# Patient Record
Sex: FEMALE | Race: ASIAN | NOT HISPANIC OR LATINO
[De-identification: names, ages, dates, MRNs, and addresses within clinical notes are randomized per-mention and may not be internally consistent; named-entity substitution may affect disease eponyms.]

---

## 2022-12-07 PROBLEM — Z00.00 ENCOUNTER FOR PREVENTIVE HEALTH EXAMINATION: Status: ACTIVE | Noted: 2022-12-07

## 2022-12-09 ENCOUNTER — NON-APPOINTMENT (OUTPATIENT)
Age: 37
End: 2022-12-09

## 2022-12-09 ENCOUNTER — APPOINTMENT (OUTPATIENT)
Dept: OBGYN | Facility: CLINIC | Age: 37
End: 2022-12-09

## 2022-12-09 ENCOUNTER — TRANSCRIPTION ENCOUNTER (OUTPATIENT)
Age: 37
End: 2022-12-09

## 2022-12-09 VITALS
DIASTOLIC BLOOD PRESSURE: 79 MMHG | SYSTOLIC BLOOD PRESSURE: 132 MMHG | HEIGHT: 65 IN | WEIGHT: 113 LBS | BODY MASS INDEX: 18.83 KG/M2

## 2022-12-09 PROCEDURE — 99385 PREV VISIT NEW AGE 18-39: CPT

## 2022-12-09 NOTE — PHYSICAL EXAM
[Chaperone Declined] : Patient declined chaperone [Appropriately responsive] : appropriately responsive [Alert] : alert [No Acute Distress] : no acute distress [Soft] : soft [Non-tender] : non-tender [Non-distended] : non-distended [No HSM] : No HSM [No Lesions] : no lesions [No Mass] : no mass [Oriented x3] : oriented x3 [Examination Of The Breasts] : a normal appearance [No Masses] : no breast masses were palpable [Labia Majora] : normal [Labia Minora] : normal [Normal] : normal [Uterine Adnexae] : normal

## 2022-12-12 LAB — HPV HIGH+LOW RISK DNA PNL CVX: NOT DETECTED

## 2022-12-20 LAB — CYTOLOGY CVX/VAG DOC THIN PREP: NORMAL

## 2023-12-11 ENCOUNTER — APPOINTMENT (OUTPATIENT)
Dept: OBGYN | Facility: CLINIC | Age: 38
End: 2023-12-11
Payer: COMMERCIAL

## 2023-12-11 VITALS
DIASTOLIC BLOOD PRESSURE: 75 MMHG | HEIGHT: 65 IN | BODY MASS INDEX: 19.49 KG/M2 | SYSTOLIC BLOOD PRESSURE: 116 MMHG | WEIGHT: 117 LBS

## 2023-12-11 DIAGNOSIS — Z01.419 ENCOUNTER FOR GYNECOLOGICAL EXAMINATION (GENERAL) (ROUTINE) W/OUT ABNORMAL FINDINGS: ICD-10-CM

## 2023-12-11 PROCEDURE — 99395 PREV VISIT EST AGE 18-39: CPT

## 2024-02-08 ENCOUNTER — TRANSCRIPTION ENCOUNTER (OUTPATIENT)
Age: 39
End: 2024-02-08

## 2024-02-15 ENCOUNTER — APPOINTMENT (OUTPATIENT)
Dept: OBGYN | Facility: CLINIC | Age: 39
End: 2024-02-15
Payer: COMMERCIAL

## 2024-02-15 VITALS — DIASTOLIC BLOOD PRESSURE: 73 MMHG | BODY MASS INDEX: 19.8 KG/M2 | SYSTOLIC BLOOD PRESSURE: 120 MMHG | WEIGHT: 119 LBS

## 2024-02-15 DIAGNOSIS — N92.6 IRREGULAR MENSTRUATION, UNSPECIFIED: ICD-10-CM

## 2024-02-15 PROCEDURE — 99213 OFFICE O/P EST LOW 20 MIN: CPT | Mod: 25

## 2024-02-15 PROCEDURE — 76817 TRANSVAGINAL US OBSTETRIC: CPT

## 2024-02-15 NOTE — HISTORY OF PRESENT ILLNESS
[FreeTextEntry1] : LMP   37yo  here for amenorrhea and +upt, would be at 7+5. Notes nausea and vomiting. Here with  today.  #Routine - POB: FT  x 3 (2017- girl Lorin 6#, 2018- boy Cayla 7+#, - Zaynab, initially was told had previa but resolved, had PPH requiring immediate OR for ?cervical repair, no blood transfusion) - pap 2023 nilm/hpv neg

## 2024-02-15 NOTE — PROCEDURE
[Transvaginal OB Sonogram] : Transvaginal OB Sonogram [Intrauterine Pregnancy] : intrauterine pregnancy [Yolk Sac] : yolk sac present [Fetal Heart] : fetal heart present [Date: ___] : Date: [unfilled] [Current GA by Sonogram: ___ (wks)] : Current GA by Sonogram: [unfilled]Uwks [___ day(s)] : [unfilled] days [FreeTextEntry1] : T 164

## 2024-02-15 NOTE — PLAN
[FreeTextEntry1] : S=d disucsed practice. RTO 4 wk for new OB. Discussed unisom/b6, to CBO if not improved.  Pt traveling to [place] in 8/2024, discussed travel precautions. NT given.

## 2024-03-14 ENCOUNTER — APPOINTMENT (OUTPATIENT)
Dept: ANTEPARTUM | Facility: CLINIC | Age: 39
End: 2024-03-14
Payer: COMMERCIAL

## 2024-03-14 ENCOUNTER — ASOB RESULT (OUTPATIENT)
Age: 39
End: 2024-03-14

## 2024-03-14 ENCOUNTER — APPOINTMENT (OUTPATIENT)
Dept: OBGYN | Facility: CLINIC | Age: 39
End: 2024-03-14
Payer: COMMERCIAL

## 2024-03-14 VITALS
SYSTOLIC BLOOD PRESSURE: 120 MMHG | DIASTOLIC BLOOD PRESSURE: 77 MMHG | WEIGHT: 122.13 LBS | BODY MASS INDEX: 20.32 KG/M2

## 2024-03-14 DIAGNOSIS — R11.2 NAUSEA WITH VOMITING, UNSPECIFIED: ICD-10-CM

## 2024-03-14 DIAGNOSIS — Z34.91 ENCOUNTER FOR SUPERVISION OF NORMAL PREGNANCY, UNSPECIFIED, FIRST TRIMESTER: ICD-10-CM

## 2024-03-14 PROCEDURE — 76801 OB US < 14 WKS SINGLE FETUS: CPT | Mod: 59

## 2024-03-14 PROCEDURE — 0501F PRENATAL FLOW SHEET: CPT

## 2024-03-14 PROCEDURE — 76813 OB US NUCHAL MEAS 1 GEST: CPT

## 2024-03-14 RX ORDER — ONDANSETRON 4 MG/1
4 TABLET ORAL
Qty: 90 | Refills: 0 | Status: ACTIVE | COMMUNITY
Start: 2024-03-14 | End: 1900-01-01

## 2024-03-14 RX ORDER — PROMETHAZINE HYDROCHLORIDE 25 MG/1
25 TABLET ORAL 3 TIMES DAILY
Qty: 90 | Refills: 0 | Status: ACTIVE | COMMUNITY
Start: 2024-03-14 | End: 1900-01-01

## 2024-03-15 LAB
ABO + RH PNL BLD: NORMAL
BASOPHILS # BLD AUTO: 0.02 K/UL
BASOPHILS NFR BLD AUTO: 0.2 %
BLD GP AB SCN SERPL QL: NORMAL
EOSINOPHIL # BLD AUTO: 0.1 K/UL
EOSINOPHIL NFR BLD AUTO: 1.1 %
HBV SURFACE AG SER QL: NONREACTIVE
HCT VFR BLD CALC: 39.1 %
HCV AB SER QL: NONREACTIVE
HCV S/CO RATIO: 0.07 S/CO
HGB A MFR BLD: 97.3 %
HGB A2 MFR BLD: 2.7 %
HGB BLD-MCNC: 12.5 G/DL
HGB FRACT BLD-IMP: NORMAL
HIV1+2 AB SPEC QL IA.RAPID: NONREACTIVE
IMM GRANULOCYTES NFR BLD AUTO: 0.2 %
LYMPHOCYTES # BLD AUTO: 1.18 K/UL
LYMPHOCYTES NFR BLD AUTO: 13.3 %
MAN DIFF?: NORMAL
MCHC RBC-ENTMCNC: 31.6 PG
MCHC RBC-ENTMCNC: 32 GM/DL
MCV RBC AUTO: 99 FL
MEV IGG FLD QL IA: 159 AU/ML
MEV IGG+IGM SER-IMP: POSITIVE
MONOCYTES # BLD AUTO: 0.62 K/UL
MONOCYTES NFR BLD AUTO: 7 %
NEUTROPHILS # BLD AUTO: 6.93 K/UL
NEUTROPHILS NFR BLD AUTO: 78.2 %
PLATELET # BLD AUTO: 192 K/UL
RBC # BLD: 3.95 M/UL
RBC # FLD: 13.1 %
RUBV IGG FLD-ACNC: 1.7 INDEX
RUBV IGG SER-IMP: POSITIVE
T PALLIDUM AB SER QL IA: NEGATIVE
TSH SERPL-ACNC: 1.39 UIU/ML
VZV AB TITR SER: POSITIVE
VZV IGG SER IF-ACNC: 413 INDEX
WBC # FLD AUTO: 8.87 K/UL

## 2024-03-19 LAB — AR GENE MUT ANL BLD/T: NORMAL

## 2024-03-20 LAB — LEAD BLD-MCNC: <1 UG/DL

## 2024-03-21 LAB
CFTR MUT TESTED BLD/T: NEGATIVE
FMR1 GENE MUT ANL BLD/T: NORMAL

## 2024-04-06 ENCOUNTER — NON-APPOINTMENT (OUTPATIENT)
Age: 39
End: 2024-04-06

## 2024-04-11 ENCOUNTER — APPOINTMENT (OUTPATIENT)
Dept: OBGYN | Facility: CLINIC | Age: 39
End: 2024-04-11
Payer: COMMERCIAL

## 2024-04-11 VITALS
BODY MASS INDEX: 21.82 KG/M2 | SYSTOLIC BLOOD PRESSURE: 121 MMHG | DIASTOLIC BLOOD PRESSURE: 78 MMHG | WEIGHT: 131.13 LBS

## 2024-04-11 PROCEDURE — 0502F SUBSEQUENT PRENATAL CARE: CPT

## 2024-04-16 LAB
AFP MOM: 1.19
AFP VALUE: 41.9 NG/ML
ALPHA FETOPROTEIN SERUM COMMENT: NORMAL
ALPHA FETOPROTEIN SERUM INTERPRETATION: NORMAL
ALPHA FETOPROTEIN SERUM RESULTS: NORMAL
ALPHA FETOPROTEIN SERUM TEST RESULTS: NORMAL
GESTATIONAL AGE BASED ON: NORMAL
GESTATIONAL AGE ON COLLECTION DATE: 15.7 WEEKS
INSULIN DEP DIABETES: NO
MATERNAL AGE AT EDD AFP: 38.9 YR
MULTIPLE GESTATION: NO
OSBR RISK 1 IN: 6704
RACE: NORMAL
WEIGHT AFP: 131 LBS

## 2024-04-19 ENCOUNTER — NON-APPOINTMENT (OUTPATIENT)
Age: 39
End: 2024-04-19

## 2024-05-06 ENCOUNTER — NON-APPOINTMENT (OUTPATIENT)
Age: 39
End: 2024-05-06

## 2024-05-08 ENCOUNTER — APPOINTMENT (OUTPATIENT)
Dept: ANTEPARTUM | Facility: CLINIC | Age: 39
End: 2024-05-08
Payer: COMMERCIAL

## 2024-05-08 ENCOUNTER — APPOINTMENT (OUTPATIENT)
Dept: OBGYN | Facility: CLINIC | Age: 39
End: 2024-05-08
Payer: COMMERCIAL

## 2024-05-08 ENCOUNTER — ASOB RESULT (OUTPATIENT)
Age: 39
End: 2024-05-08

## 2024-05-08 VITALS
DIASTOLIC BLOOD PRESSURE: 66 MMHG | BODY MASS INDEX: 22.15 KG/M2 | SYSTOLIC BLOOD PRESSURE: 113 MMHG | WEIGHT: 133.13 LBS

## 2024-05-08 PROCEDURE — 76811 OB US DETAILED SNGL FETUS: CPT

## 2024-05-08 PROCEDURE — 0502F SUBSEQUENT PRENATAL CARE: CPT

## 2024-05-31 ENCOUNTER — NON-APPOINTMENT (OUTPATIENT)
Age: 39
End: 2024-05-31

## 2024-06-05 ENCOUNTER — APPOINTMENT (OUTPATIENT)
Dept: OBGYN | Facility: CLINIC | Age: 39
End: 2024-06-05
Payer: COMMERCIAL

## 2024-06-05 VITALS
DIASTOLIC BLOOD PRESSURE: 67 MMHG | WEIGHT: 135 LBS | HEIGHT: 65 IN | BODY MASS INDEX: 22.49 KG/M2 | SYSTOLIC BLOOD PRESSURE: 104 MMHG

## 2024-06-05 DIAGNOSIS — Z34.92 ENCOUNTER FOR SUPERVISION OF NORMAL PREGNANCY, UNSPECIFIED, SECOND TRIMESTER: ICD-10-CM

## 2024-06-05 PROCEDURE — 0502F SUBSEQUENT PRENATAL CARE: CPT

## 2024-06-06 LAB
BASOPHILS # BLD AUTO: 0.04 K/UL
BASOPHILS NFR BLD AUTO: 0.4 %
EOSINOPHIL # BLD AUTO: 0.12 K/UL
EOSINOPHIL NFR BLD AUTO: 1.3 %
GLUCOSE 1H P 50 G GLC PO SERPL-MCNC: 83 MG/DL
HCT VFR BLD CALC: 32.1 %
HGB BLD-MCNC: 10.3 G/DL
HIV1+2 AB SPEC QL IA.RAPID: NONREACTIVE
IMM GRANULOCYTES NFR BLD AUTO: 0.3 %
LYMPHOCYTES # BLD AUTO: 0.93 K/UL
LYMPHOCYTES NFR BLD AUTO: 10.2 %
MAN DIFF?: NORMAL
MCHC RBC-ENTMCNC: 32.1 GM/DL
MCHC RBC-ENTMCNC: 32.7 PG
MCV RBC AUTO: 101.9 FL
MONOCYTES # BLD AUTO: 0.6 K/UL
MONOCYTES NFR BLD AUTO: 6.6 %
NEUTROPHILS # BLD AUTO: 7.41 K/UL
NEUTROPHILS NFR BLD AUTO: 81.2 %
PLATELET # BLD AUTO: 200 K/UL
RBC # BLD: 3.15 M/UL
RBC # FLD: 14.6 %
T PALLIDUM AB SER QL IA: NEGATIVE
WBC # FLD AUTO: 9.13 K/UL

## 2024-07-03 ENCOUNTER — NON-APPOINTMENT (OUTPATIENT)
Age: 39
End: 2024-07-03

## 2024-07-09 ENCOUNTER — APPOINTMENT (OUTPATIENT)
Dept: OBGYN | Facility: CLINIC | Age: 39
End: 2024-07-09
Payer: COMMERCIAL

## 2024-07-09 VITALS
HEIGHT: 65 IN | WEIGHT: 136.31 LBS | SYSTOLIC BLOOD PRESSURE: 106 MMHG | BODY MASS INDEX: 22.71 KG/M2 | DIASTOLIC BLOOD PRESSURE: 68 MMHG

## 2024-07-09 PROCEDURE — 0502F SUBSEQUENT PRENATAL CARE: CPT

## 2024-07-09 PROCEDURE — 90715 TDAP VACCINE 7 YRS/> IM: CPT

## 2024-07-09 PROCEDURE — 90471 IMMUNIZATION ADMIN: CPT

## 2024-07-17 ENCOUNTER — APPOINTMENT (OUTPATIENT)
Dept: ANTEPARTUM | Facility: CLINIC | Age: 39
End: 2024-07-17
Payer: COMMERCIAL

## 2024-07-17 ENCOUNTER — ASOB RESULT (OUTPATIENT)
Age: 39
End: 2024-07-17

## 2024-07-17 PROCEDURE — 76816 OB US FOLLOW-UP PER FETUS: CPT

## 2024-07-17 PROCEDURE — 76819 FETAL BIOPHYS PROFIL W/O NST: CPT | Mod: 59

## 2024-07-23 ENCOUNTER — NON-APPOINTMENT (OUTPATIENT)
Age: 39
End: 2024-07-23

## 2024-07-23 ENCOUNTER — APPOINTMENT (OUTPATIENT)
Dept: OBGYN | Facility: CLINIC | Age: 39
End: 2024-07-23
Payer: COMMERCIAL

## 2024-07-23 VITALS
SYSTOLIC BLOOD PRESSURE: 103 MMHG | WEIGHT: 140.25 LBS | BODY MASS INDEX: 23.37 KG/M2 | DIASTOLIC BLOOD PRESSURE: 69 MMHG | HEIGHT: 65 IN

## 2024-07-23 DIAGNOSIS — Z34.91 ENCOUNTER FOR SUPERVISION OF NORMAL PREGNANCY, UNSPECIFIED, FIRST TRIMESTER: ICD-10-CM

## 2024-07-23 DIAGNOSIS — Z34.93 ENCOUNTER FOR SUPERVISION OF NORMAL PREGNANCY, UNSPECIFIED, THIRD TRIMESTER: ICD-10-CM

## 2024-07-23 DIAGNOSIS — Z34.92 ENCOUNTER FOR SUPERVISION OF NORMAL PREGNANCY, UNSPECIFIED, SECOND TRIMESTER: ICD-10-CM

## 2024-07-23 PROCEDURE — 0502F SUBSEQUENT PRENATAL CARE: CPT

## 2024-08-12 ENCOUNTER — NON-APPOINTMENT (OUTPATIENT)
Age: 39
End: 2024-08-12

## 2024-08-14 ENCOUNTER — APPOINTMENT (OUTPATIENT)
Dept: OBGYN | Facility: CLINIC | Age: 39
End: 2024-08-14
Payer: COMMERCIAL

## 2024-08-14 VITALS — BODY MASS INDEX: 23.46 KG/M2 | WEIGHT: 141 LBS | SYSTOLIC BLOOD PRESSURE: 100 MMHG | DIASTOLIC BLOOD PRESSURE: 65 MMHG

## 2024-08-14 DIAGNOSIS — Z34.93 ENCOUNTER FOR SUPERVISION OF NORMAL PREGNANCY, UNSPECIFIED, THIRD TRIMESTER: ICD-10-CM

## 2024-08-14 PROCEDURE — 0502F SUBSEQUENT PRENATAL CARE: CPT

## 2024-09-04 ENCOUNTER — APPOINTMENT (OUTPATIENT)
Dept: OBGYN | Facility: CLINIC | Age: 39
End: 2024-09-04
Payer: COMMERCIAL

## 2024-09-04 VITALS — BODY MASS INDEX: 23.96 KG/M2 | DIASTOLIC BLOOD PRESSURE: 60 MMHG | SYSTOLIC BLOOD PRESSURE: 112 MMHG | WEIGHT: 144 LBS

## 2024-09-04 DIAGNOSIS — Z34.93 ENCOUNTER FOR SUPERVISION OF NORMAL PREGNANCY, UNSPECIFIED, THIRD TRIMESTER: ICD-10-CM

## 2024-09-04 PROCEDURE — 0502F SUBSEQUENT PRENATAL CARE: CPT

## 2024-09-04 PROCEDURE — 90686 IIV4 VACC NO PRSV 0.5 ML IM: CPT

## 2024-09-04 PROCEDURE — G0008: CPT

## 2024-09-06 LAB
GP B STREP DNA SPEC QL NAA+PROBE: NOT DETECTED
SOURCE GBS: NORMAL

## 2024-09-11 ENCOUNTER — APPOINTMENT (OUTPATIENT)
Dept: ANTEPARTUM | Facility: CLINIC | Age: 39
End: 2024-09-11
Payer: COMMERCIAL

## 2024-09-11 ENCOUNTER — APPOINTMENT (OUTPATIENT)
Dept: OBGYN | Facility: CLINIC | Age: 39
End: 2024-09-11
Payer: COMMERCIAL

## 2024-09-11 ENCOUNTER — ASOB RESULT (OUTPATIENT)
Age: 39
End: 2024-09-11

## 2024-09-11 VITALS
WEIGHT: 144.13 LBS | DIASTOLIC BLOOD PRESSURE: 74 MMHG | HEIGHT: 65 IN | SYSTOLIC BLOOD PRESSURE: 111 MMHG | BODY MASS INDEX: 24.01 KG/M2

## 2024-09-11 PROCEDURE — 76816 OB US FOLLOW-UP PER FETUS: CPT

## 2024-09-11 PROCEDURE — 81003 URINALYSIS AUTO W/O SCOPE: CPT | Mod: QW

## 2024-09-11 PROCEDURE — 0502F SUBSEQUENT PRENATAL CARE: CPT

## 2024-09-11 PROCEDURE — 76819 FETAL BIOPHYS PROFIL W/O NST: CPT | Mod: 59

## 2024-09-17 ENCOUNTER — TRANSCRIPTION ENCOUNTER (OUTPATIENT)
Age: 39
End: 2024-09-17

## 2024-09-18 ENCOUNTER — APPOINTMENT (OUTPATIENT)
Dept: OBGYN | Facility: CLINIC | Age: 39
End: 2024-09-18
Payer: COMMERCIAL

## 2024-09-18 VITALS
WEIGHT: 145.13 LBS | BODY MASS INDEX: 24.18 KG/M2 | DIASTOLIC BLOOD PRESSURE: 83 MMHG | SYSTOLIC BLOOD PRESSURE: 125 MMHG | HEIGHT: 65 IN

## 2024-09-18 PROCEDURE — 0502F SUBSEQUENT PRENATAL CARE: CPT

## 2024-09-18 PROCEDURE — 81003 URINALYSIS AUTO W/O SCOPE: CPT | Mod: QW

## 2024-09-25 ENCOUNTER — APPOINTMENT (OUTPATIENT)
Dept: OBGYN | Facility: CLINIC | Age: 39
End: 2024-09-25
Payer: COMMERCIAL

## 2024-09-25 VITALS — SYSTOLIC BLOOD PRESSURE: 114 MMHG | BODY MASS INDEX: 24.46 KG/M2 | WEIGHT: 147 LBS | DIASTOLIC BLOOD PRESSURE: 73 MMHG

## 2024-09-25 DIAGNOSIS — Z34.93 ENCOUNTER FOR SUPERVISION OF NORMAL PREGNANCY, UNSPECIFIED, THIRD TRIMESTER: ICD-10-CM

## 2024-09-25 PROCEDURE — 0502F SUBSEQUENT PRENATAL CARE: CPT

## 2024-09-27 ENCOUNTER — NON-APPOINTMENT (OUTPATIENT)
Age: 39
End: 2024-09-27

## 2024-09-28 ENCOUNTER — NON-APPOINTMENT (OUTPATIENT)
Age: 39
End: 2024-09-28

## 2024-09-28 ENCOUNTER — INPATIENT (INPATIENT)
Facility: HOSPITAL | Age: 39
LOS: 1 days | Discharge: HOME | End: 2024-09-30
Attending: OBSTETRICS & GYNECOLOGY | Admitting: OBSTETRICS & GYNECOLOGY
Payer: COMMERCIAL

## 2024-09-28 VITALS
TEMPERATURE: 99 F | HEART RATE: 78 BPM | SYSTOLIC BLOOD PRESSURE: 105 MMHG | RESPIRATION RATE: 18 BRPM | OXYGEN SATURATION: 98 % | DIASTOLIC BLOOD PRESSURE: 63 MMHG

## 2024-09-28 LAB
BASOPHILS # BLD AUTO: 0.02 K/UL — SIGNIFICANT CHANGE UP (ref 0–0.2)
BASOPHILS NFR BLD AUTO: 0.3 % — SIGNIFICANT CHANGE UP (ref 0–2)
BLD GP AB SCN SERPL QL: NEGATIVE — SIGNIFICANT CHANGE UP
EOSINOPHIL # BLD AUTO: 0.12 K/UL — SIGNIFICANT CHANGE UP (ref 0–0.5)
EOSINOPHIL NFR BLD AUTO: 1.9 % — SIGNIFICANT CHANGE UP (ref 0–6)
HCT VFR BLD CALC: 33.6 % — LOW (ref 34.5–45)
HGB BLD-MCNC: 11.1 G/DL — LOW (ref 11.5–15.5)
IMM GRANULOCYTES NFR BLD AUTO: 0.5 % — SIGNIFICANT CHANGE UP (ref 0–0.9)
LYMPHOCYTES # BLD AUTO: 0.92 K/UL — LOW (ref 1–3.3)
LYMPHOCYTES # BLD AUTO: 14.3 % — SIGNIFICANT CHANGE UP (ref 13–44)
MCHC RBC-ENTMCNC: 31.4 PG — SIGNIFICANT CHANGE UP (ref 27–34)
MCHC RBC-ENTMCNC: 33 GM/DL — SIGNIFICANT CHANGE UP (ref 32–36)
MCV RBC AUTO: 95.2 FL — SIGNIFICANT CHANGE UP (ref 80–100)
MONOCYTES # BLD AUTO: 0.52 K/UL — SIGNIFICANT CHANGE UP (ref 0–0.9)
MONOCYTES NFR BLD AUTO: 8.1 % — SIGNIFICANT CHANGE UP (ref 2–14)
NEUTROPHILS # BLD AUTO: 4.81 K/UL — SIGNIFICANT CHANGE UP (ref 1.8–7.4)
NEUTROPHILS NFR BLD AUTO: 74.9 % — SIGNIFICANT CHANGE UP (ref 43–77)
NRBC # BLD: 0 /100 WBCS — SIGNIFICANT CHANGE UP (ref 0–0)
PLATELET # BLD AUTO: 158 K/UL — SIGNIFICANT CHANGE UP (ref 150–400)
RBC # BLD: 3.53 M/UL — LOW (ref 3.8–5.2)
RBC # FLD: 13.6 % — SIGNIFICANT CHANGE UP (ref 10.3–14.5)
RH IG SCN BLD-IMP: POSITIVE — SIGNIFICANT CHANGE UP
RH IG SCN BLD-IMP: POSITIVE — SIGNIFICANT CHANGE UP
T PALLIDUM AB TITR SER: NEGATIVE — SIGNIFICANT CHANGE UP
WBC # BLD: 6.42 K/UL — SIGNIFICANT CHANGE UP (ref 3.8–10.5)
WBC # FLD AUTO: 6.42 K/UL — SIGNIFICANT CHANGE UP (ref 3.8–10.5)

## 2024-09-28 PROCEDURE — 59400 OBSTETRICAL CARE: CPT

## 2024-09-28 RX ORDER — OXYTOCIN/RINGER'S LACTATE 20/500ML
10 PLASTIC BAG, INJECTION (ML) INTRAVENOUS ONCE
Refills: 0 | Status: COMPLETED | OUTPATIENT
Start: 2024-09-28 | End: 2024-09-28

## 2024-09-28 RX ORDER — ACETAMINOPHEN 325 MG
975 TABLET ORAL
Refills: 0 | Status: DISCONTINUED | OUTPATIENT
Start: 2024-09-28 | End: 2024-09-30

## 2024-09-28 RX ORDER — TETANUS TOXOID, REDUCED DIPHTHERIA TOXOID AND ACELLULAR PERTUSSIS VACCINE, ADSORBED 5; 2.5; 8; 8; 2.5 [IU]/.5ML; [IU]/.5ML; UG/.5ML; UG/.5ML; UG/.5ML
0.5 SUSPENSION INTRAMUSCULAR ONCE
Refills: 0 | Status: DISCONTINUED | OUTPATIENT
Start: 2024-09-28 | End: 2024-09-30

## 2024-09-28 RX ORDER — SODIUM CHLORIDE IRRIG SOLUTION 0.9 %
1000 SOLUTION, IRRIGATION IRRIGATION
Refills: 0 | Status: DISCONTINUED | OUTPATIENT
Start: 2024-09-28 | End: 2024-09-28

## 2024-09-28 RX ORDER — PRAMOXINE HYDROCHLORIDE 10 MG/ML
1 LOTION TOPICAL EVERY 4 HOURS
Refills: 0 | Status: DISCONTINUED | OUTPATIENT
Start: 2024-09-28 | End: 2024-09-30

## 2024-09-28 RX ORDER — KETOROLAC TROMETHAMINE 10 MG/1
30 TABLET, FILM COATED ORAL ONCE
Refills: 0 | Status: DISCONTINUED | OUTPATIENT
Start: 2024-09-28 | End: 2024-09-28

## 2024-09-28 RX ORDER — OXYTOCIN/RINGER'S LACTATE 20/500ML
167 PLASTIC BAG, INJECTION (ML) INTRAVENOUS
Qty: 30 | Refills: 0 | Status: DISCONTINUED | OUTPATIENT
Start: 2024-09-28 | End: 2024-09-30

## 2024-09-28 RX ORDER — OXYTOCIN/RINGER'S LACTATE 20/500ML
167 PLASTIC BAG, INJECTION (ML) INTRAVENOUS
Qty: 30 | Refills: 0 | Status: DISCONTINUED | OUTPATIENT
Start: 2024-09-28 | End: 2024-09-28

## 2024-09-28 RX ORDER — SOAP/LANOLIN
1 BAR TOPICAL EVERY 4 HOURS
Refills: 0 | Status: DISCONTINUED | OUTPATIENT
Start: 2024-09-28 | End: 2024-09-30

## 2024-09-28 RX ORDER — OXYCODONE HYDROCHLORIDE 30 MG/1
5 TABLET, FILM COATED, EXTENDED RELEASE ORAL ONCE
Refills: 0 | Status: DISCONTINUED | OUTPATIENT
Start: 2024-09-28 | End: 2024-09-30

## 2024-09-28 RX ORDER — ANTI-ITCH CREAM 1 G/100G
1 OINTMENT TOPICAL EVERY 6 HOURS
Refills: 0 | Status: DISCONTINUED | OUTPATIENT
Start: 2024-09-28 | End: 2024-09-30

## 2024-09-28 RX ORDER — DIPHENHYDRAMINE HCL 12.5MG/5ML
25 LIQUID (ML) ORAL EVERY 6 HOURS
Refills: 0 | Status: DISCONTINUED | OUTPATIENT
Start: 2024-09-28 | End: 2024-09-30

## 2024-09-28 RX ORDER — OXYCODONE HYDROCHLORIDE 30 MG/1
5 TABLET, FILM COATED, EXTENDED RELEASE ORAL
Refills: 0 | Status: DISCONTINUED | OUTPATIENT
Start: 2024-09-28 | End: 2024-09-30

## 2024-09-28 RX ORDER — SODIUM CITRATE AND CITRIC ACID MONOHYDRATE 334; 500 MG/5ML; MG/5ML
15 SOLUTION ORAL EVERY 6 HOURS
Refills: 0 | Status: DISCONTINUED | OUTPATIENT
Start: 2024-09-28 | End: 2024-09-28

## 2024-09-28 RX ORDER — DIBUCAINE 1 %
1 OINTMENT (GRAM) TOPICAL EVERY 6 HOURS
Refills: 0 | Status: DISCONTINUED | OUTPATIENT
Start: 2024-09-28 | End: 2024-09-30

## 2024-09-28 RX ORDER — CHLORHEXIDINE GLUCONATE ORAL RINSE 1.2 MG/ML
1 SOLUTION DENTAL DAILY
Refills: 0 | Status: DISCONTINUED | OUTPATIENT
Start: 2024-09-28 | End: 2024-09-28

## 2024-09-28 RX ORDER — SODIUM CHLORIDE 0.9 % (FLUSH) 0.9 %
3 SYRINGE (ML) INJECTION EVERY 8 HOURS
Refills: 0 | Status: DISCONTINUED | OUTPATIENT
Start: 2024-09-28 | End: 2024-09-30

## 2024-09-28 RX ORDER — MAGNESIUM HYDROXIDE 400 MG/5ML
30 SUSPENSION, ORAL (FINAL DOSE FORM) ORAL
Refills: 0 | Status: DISCONTINUED | OUTPATIENT
Start: 2024-09-28 | End: 2024-09-30

## 2024-09-28 RX ORDER — PRENATAL VIT,CAL 76/IRON/FOLIC 29 MG-1 MG
1 TABLET ORAL DAILY
Refills: 0 | Status: DISCONTINUED | OUTPATIENT
Start: 2024-09-28 | End: 2024-09-30

## 2024-09-28 RX ADMIN — Medication 1 TABLET(S): at 18:09

## 2024-09-28 RX ADMIN — Medication 125 MILLILITER(S): at 03:49

## 2024-09-28 RX ADMIN — Medication 975 MILLIGRAM(S): at 21:15

## 2024-09-28 RX ADMIN — Medication 10 UNIT(S): at 10:35

## 2024-09-28 RX ADMIN — Medication 975 MILLIGRAM(S): at 16:00

## 2024-09-28 RX ADMIN — KETOROLAC TROMETHAMINE 30 MILLIGRAM(S): 10 TABLET, FILM COATED ORAL at 11:52

## 2024-09-28 RX ADMIN — Medication 600 MILLIGRAM(S): at 19:31

## 2024-09-28 RX ADMIN — Medication 600 MILLIGRAM(S): at 17:47

## 2024-09-28 RX ADMIN — Medication 975 MILLIGRAM(S): at 15:12

## 2024-09-28 RX ADMIN — Medication 975 MILLIGRAM(S): at 21:59

## 2024-09-28 RX ADMIN — Medication 167 MILLIUNIT(S)/MIN: at 10:32

## 2024-09-28 NOTE — DISCUSSION/SUMMARY
[FreeTextEntry1] : 37yo P4 s/p  on  after eIOL. Mild JAH atony, EBL 400cc s/p pit IM and cytotec NC. "Tiffanie" 4di52um

## 2024-09-28 NOTE — OB PROVIDER DELIVERY SUMMARY - NSPROVIDERDELIVERYNOTE_OBGYN_ALL_OB_FT
Patient found to be fully dilated and felt urge to push. Head, shoulders and body delivered without complication. Delayed cord clamping performed, cord clamped and cut. Infant handed to mother for skin to skin. Cord segment collected and blood gases obtained. Gentle traction on umbilical cord with spontaneous delivery of placenta. Noted to have mild JAH atony and given pitocin IM and cytotec NV 1000mcg. Bimanual massage with good uterine tone, cervix inspected and found to be intact. Sulcus intact, noted to have 2nd perineal laceration repaired with 2-0 vicryle rapide. Total EBL 400cc.

## 2024-09-28 NOTE — OB RN PATIENT PROFILE - FALL HARM RISK - UNIVERSAL INTERVENTIONS
Bed in lowest position, wheels locked, appropriate side rails in place/Call bell, personal items and telephone in reach/Instruct patient to call for assistance before getting out of bed or chair/Non-slip footwear when patient is out of bed/[place] to call system/Physically safe environment - no spills, clutter or unnecessary equipment/Purposeful Proactive Rounding/Room/bathroom lighting operational, light cord in reach

## 2024-09-28 NOTE — OB PROVIDER LABOR PROGRESS NOTE - ASSESSMENT
SROMed for clear fluid at 945am, feeling more pressure and intermittent variables seen. Found to be fully dilated. Will start pushing. Cat 1 tracing, anticipate .    Maggi CASTELLANO

## 2024-09-28 NOTE — OB PROVIDER H&P - NSLOWPPHRISK_OBGYN_A_OB
No previous uterine incision/Perez Pregnancy/Less than or equal to 4 previous vaginal births/No known bleeding disorder

## 2024-09-28 NOTE — OB PROVIDER H&P - ATTENDING COMMENTS
OB attg note    37yo P3 at 40wk here for eIOL. Prior FT  x 3, hx of childhood asthma and PPH with 3rd delivery c/b cervical repair in the OR. S/p two doses of buccal cytotec, SVE 3/80/-2 and s/p epidural. Plan to switch to pitocin and AROM on next visit. Consents in chart. EFW 3200. Cat 1 tracing, anticipate .    Maggi CASTELLANO

## 2024-09-28 NOTE — OB RN DELIVERY SUMMARY - NS_SEPSISRSKCALC_OBGYN_ALL_OB_FT
EOS calculated successfully. EOS Risk Factor: 0.5/1000 live births ([facility] national incidence); GA=40w;Temp=98.78; ROM=0.683; GBS='Negative'; Antibiotics='No antibiotics or any antibiotics < 2 hrs prior to birth'

## 2024-09-28 NOTE — OB PROVIDER H&P - ASSESSMENT
Assessment  38y   @ 40w admitted for eIOL.    Plan  1. Admit to L+D. Routine Labs. IVF.  2. IOL w/ CB.  3. Fetus: cat 1 tracing. VTX. Continuous EFM. Sono. No concerns.  4. Prenatal issues: none  5. GBSneg  6. Pain: IV pain meds/epidural PRN      Plan per attending physician, Dr. Anam Rios MD  PGY1

## 2024-09-28 NOTE — OB RN DELIVERY SUMMARY - NSSELHIDDEN_OBGYN_ALL_OB_FT
[NS_DeliveryAttending1_OBGYN_ALL_OB_FT:[email or web address]=],[NS_DeliveryRN_OBGYN_ALL_OB_FT:[email or web address]=] [NS_DeliveryAttending1_OBGYN_ALL_OB_FT:[email or web address]=],[NS_DeliveryRN_OBGYN_ALL_OB_FT:[email or web address]=],[NS_DeliveryAssist1_OBGYN_ALL_OB_FT:[id number]=]

## 2024-09-28 NOTE — OB PROVIDER H&P - HISTORY OF PRESENT ILLNESS
Subjective  HPI: 38y GP @ w presents for   +FM. -LOF. -CTXs. -VB. Pt denies any other concerns.    – PNC: Denies prenatal issues. GBS pos/neg. EFW g by sono.  – OBHx:   – GynHx: denies fibroids, cysts, endometriosis, abnormal pap smears, STIs  – PMH: denies  – PSH: denies  – Psych: denies   – Social: denies   – Meds: PNV   – Allergies: NKDA  – Will accept blood transfusions? Yes    Objective  – VS  T(C): 37 (09-28-24 @ 03:24)  HR: 72 (09-28-24 @ 03:34)  BP: 105/63 (09-28-24 @ 03:24)  RR: 18 (09-28-24 @ 03:24)  SpO2: 100% (09-28-24 @ 03:34)    Physical Exam  CV: RRR  Pulm: breathing comfortably on RA  Abd: gravid, nontender  Extr: moving all extremities with ease  – FS:   – Spec: pooling, nitrazine, ferning, bleeding,  (lesions if patient with HSV2 history)  – VE: //  – FHT: baseline 1, mod variability, +accels, -decels  – [place]: qmin  – EFW: _g by sono  – Sono: vertex    Assessment  38y  GP @ w presents for _.     Plan  1. Admit to L+D. Routine Labs. IVF.  2. Expectant management/IOL w/ ___.  3. Fetus: cat 1 tracing. VTX. EFW _g by sono. Continuous EFM. Sono. No concerns.  4. Prenatal issues: none  5. GBS (+/-/unknown)  6. Pain: IV pain meds/epidural PRN    Discharge to home with labor precautions: return to triage if patient experiences decreased FM, VB, LOF, or painful, consistent ctxs.    Plan per attending physician,  ____    Dunia Rios MD  PGY1 Subjective  HPI: 38y  @ 40w presents for eIOL.  Patient feeling irregular ctx, about 2-3 per hour. Currently not painful.   +FM. -LOF. -VB. Pt denies any other concerns.    – PNC: Denies prenatal issues. GBS neg. EFW 3400g  – OBHx:   6#,   7#,   c/b previa, PPH with cervical lac repair in OR ~7#  – GynHx: denies fibroids, cysts, endometriosis, abnormal pap smears, STIs  – PMH: hx of childhood asthma (hosp as 5yo).  – PSH: denies  – Psych: denies   – Social: denies   – Meds: PNV, Fe   – Allergies: NKDA  – Will accept blood transfusions? Yes

## 2024-09-29 RX ADMIN — Medication 600 MILLIGRAM(S): at 18:28

## 2024-09-29 RX ADMIN — Medication 600 MILLIGRAM(S): at 12:40

## 2024-09-29 RX ADMIN — Medication 975 MILLIGRAM(S): at 15:39

## 2024-09-29 RX ADMIN — Medication 600 MILLIGRAM(S): at 12:09

## 2024-09-29 RX ADMIN — Medication 975 MILLIGRAM(S): at 03:46

## 2024-09-29 RX ADMIN — Medication 975 MILLIGRAM(S): at 21:01

## 2024-09-29 RX ADMIN — Medication 600 MILLIGRAM(S): at 07:00

## 2024-09-29 RX ADMIN — Medication 600 MILLIGRAM(S): at 06:14

## 2024-09-29 RX ADMIN — Medication 600 MILLIGRAM(S): at 01:07

## 2024-09-29 RX ADMIN — Medication 975 MILLIGRAM(S): at 09:07

## 2024-09-29 RX ADMIN — Medication 975 MILLIGRAM(S): at 16:20

## 2024-09-29 RX ADMIN — Medication 975 MILLIGRAM(S): at 10:46

## 2024-09-29 RX ADMIN — Medication 1 TABLET(S): at 12:09

## 2024-09-29 RX ADMIN — Medication 600 MILLIGRAM(S): at 00:09

## 2024-09-29 RX ADMIN — Medication 975 MILLIGRAM(S): at 21:56

## 2024-09-29 RX ADMIN — Medication 600 MILLIGRAM(S): at 19:10

## 2024-09-29 RX ADMIN — Medication 975 MILLIGRAM(S): at 03:16

## 2024-09-29 NOTE — PROGRESS NOTE ADULT - ATTENDING COMMENTS
OB attg note    39yo P4 now PPD1 from . DOing well, min lochia, pain controlled. VSS, exam benign abd soft, NTND. Desires dc home tmrw.      Vital Signs Last 24 Hrs  T(C): 36.6 (29 Sep 2024 06:33), Max: 37.2 (28 Sep 2024 11:11)  T(F): 97.8 (29 Sep 2024 06:33), Max: 99 (28 Sep 2024 11:11)  HR: 60 (29 Sep 2024 06:33) (57 - 93)  BP: 107/73 (29 Sep 2024 06:33) (100/56 - 136/59)  BP(mean): --  RR: 18 (29 Sep 2024 06:33) (16 - 18)  SpO2: 98% (29 Sep 2024 06:33) (91% - 100%)    Parameters below as of 29 Sep 2024 06:33  Patient On (Oxygen Delivery Method): room air      Maggi CASTELLANO

## 2024-09-29 NOTE — PROGRESS NOTE ADULT - ASSESSMENT
37y/o  PPD#1 from uncomplicated . Patient meeting postpartum milestones at this time.    #Postpartum  - Continue with PO analgesia  - Increase ambulation  - Continue regular diet  - IV lock  - No labs    Becky Robertson PGY1

## 2024-09-30 ENCOUNTER — APPOINTMENT (OUTPATIENT)
Dept: ANTEPARTUM | Facility: CLINIC | Age: 39
End: 2024-09-30

## 2024-09-30 ENCOUNTER — TRANSCRIPTION ENCOUNTER (OUTPATIENT)
Age: 39
End: 2024-09-30

## 2024-09-30 VITALS
TEMPERATURE: 98 F | OXYGEN SATURATION: 99 % | RESPIRATION RATE: 18 BRPM | DIASTOLIC BLOOD PRESSURE: 71 MMHG | SYSTOLIC BLOOD PRESSURE: 107 MMHG | HEART RATE: 64 BPM

## 2024-09-30 PROCEDURE — 86780 TREPONEMA PALLIDUM: CPT

## 2024-09-30 PROCEDURE — 86850 RBC ANTIBODY SCREEN: CPT

## 2024-09-30 PROCEDURE — 85025 COMPLETE CBC W/AUTO DIFF WBC: CPT

## 2024-09-30 PROCEDURE — 86900 BLOOD TYPING SEROLOGIC ABO: CPT

## 2024-09-30 PROCEDURE — 59050 FETAL MONITOR W/REPORT: CPT

## 2024-09-30 PROCEDURE — 86901 BLOOD TYPING SEROLOGIC RH(D): CPT

## 2024-09-30 RX ORDER — PRAMOXINE HYDROCHLORIDE 10 MG/ML
1 LOTION TOPICAL
Qty: 0 | Refills: 0 | DISCHARGE
Start: 2024-09-30

## 2024-09-30 RX ORDER — ACETAMINOPHEN 325 MG
3 TABLET ORAL
Qty: 0 | Refills: 0 | DISCHARGE
Start: 2024-09-30

## 2024-09-30 RX ORDER — SOAP/LANOLIN
1 BAR TOPICAL
Qty: 0 | Refills: 0 | DISCHARGE
Start: 2024-09-30

## 2024-09-30 RX ORDER — PRENATAL VIT,CAL 76/IRON/FOLIC 29 MG-1 MG
1 TABLET ORAL
Qty: 0 | Refills: 0 | DISCHARGE
Start: 2024-09-30

## 2024-09-30 RX ORDER — ANTI-ITCH CREAM 1 G/100G
1 OINTMENT TOPICAL
Qty: 0 | Refills: 0 | DISCHARGE
Start: 2024-09-30

## 2024-09-30 RX ORDER — DIBUCAINE 1 %
1 OINTMENT (GRAM) TOPICAL
Qty: 0 | Refills: 0 | DISCHARGE
Start: 2024-09-30

## 2024-09-30 RX ADMIN — Medication 600 MILLIGRAM(S): at 12:45

## 2024-09-30 RX ADMIN — Medication 600 MILLIGRAM(S): at 11:45

## 2024-09-30 RX ADMIN — Medication 975 MILLIGRAM(S): at 10:00

## 2024-09-30 RX ADMIN — Medication 600 MILLIGRAM(S): at 06:40

## 2024-09-30 RX ADMIN — Medication 975 MILLIGRAM(S): at 16:50

## 2024-09-30 RX ADMIN — Medication 600 MILLIGRAM(S): at 05:53

## 2024-09-30 RX ADMIN — Medication 1 TABLET(S): at 11:46

## 2024-09-30 RX ADMIN — Medication 600 MILLIGRAM(S): at 00:03

## 2024-09-30 RX ADMIN — Medication 975 MILLIGRAM(S): at 02:30

## 2024-09-30 RX ADMIN — Medication 975 MILLIGRAM(S): at 03:00

## 2024-09-30 RX ADMIN — Medication 975 MILLIGRAM(S): at 15:50

## 2024-09-30 RX ADMIN — Medication 975 MILLIGRAM(S): at 09:00

## 2024-09-30 RX ADMIN — Medication 600 MILLIGRAM(S): at 01:15

## 2024-09-30 NOTE — DISCHARGE NOTE OB - CARE PROVIDER_API CALL
Betty Osei  Obstetrics and Gynecology  [address], [address]  [place] [address]  Phone: [phone]  Fax: [phone]  Follow Up Time:

## 2024-09-30 NOTE — DISCHARGE NOTE OB - PATIENT PORTAL LINK FT
You can access the FollowMyHealth Patient Portal offered by [facility] by registering at the following website: [email or web address]. By joining [facility]’s FollowMyHealth portal, you will also be able to view your health information using other applications (apps) compatible with our system.

## 2024-09-30 NOTE — DISCHARGE NOTE OB - CARE PLAN
1 Principal Discharge DX:	 (normal spontaneous vaginal delivery)  Assessment and plan of treatment:	Postpartum care  Pelvic rest  f/u in the office in 4-6 weeks with Dr. Osei

## 2024-09-30 NOTE — PROGRESS NOTE ADULT - SUBJECTIVE AND OBJECTIVE BOX
PPD#2- ATTENDING NOTE  KORINA OCHOA  MRN-[id number]  38y    Patient is a 38y old  Female who presents with a chief complaint of elective induction of labor (30 Sep 2024 08:47)      S: Patient doing well. Minimal lochia. Pain controlled. +breatfeeding    O: Vital Signs Last 24 Hrs  T(C): 36.7 (30 Sep 2024 05:24), Max: 36.7 (29 Sep 2024 17:18)  T(F): 98.1 (30 Sep 2024 05:24), Max: 98.1 (30 Sep 2024 05:24)  HR: 64 (30 Sep 2024 05:24) (64 - 90)  BP: 107/71 (30 Sep 2024 05:24) (107/71 - 119/80)  BP(mean): --  RR: 18 (30 Sep 2024 05:24) (18 - 18)  SpO2: 99% (30 Sep 2024 05:24) (96% - 99%)    Parameters below as of 30 Sep 2024 05:24  Patient On (Oxygen Delivery Method): room air        Gen: NAD  Abd: soft, NT, ND, fundus firm below umbilicus  Lochia: moderate  Ext: no calf tenderness b/l , no c/c/e b/l    Labs:      A/P: 38y PPD#2 s/p  patient stable  Analgesia prn, Motrin and Tylenol  Regular diet  Discharge instruction given  F/U 4-6 weeks    Regi Temple MD  Office Number [phone]
R1 Progress Note    Patient seen and examined at bedside, no acute overnight events. No acute complaints, pain well controlled. Patient is ambulating, voiding spontaneously, passing gas, and tolerating regular diet. Denies CP, SOB, N/V, HA, or blurred vision.     Vital Signs Last 24 Hours  T(C): 36.5 (09-28-24 @ 21:18), Max: 37.2 (09-28-24 @ 11:11)  HR: 66 (09-28-24 @ 21:18) (57 - 93)  BP: 110/75 (09-28-24 @ 21:18) (99/52 - 136/59)  RR: 18 (09-28-24 @ 21:18) (16 - 18)  SpO2: 98% (09-28-24 @ 21:18) (91% - 100%)    Physical Exam:  General: NAD  Abdomen: Soft, non-tender, mildly-distended, fundus firm  Pelvic: Lochia wnl  Ext: no calf tenderness b/l    Labs:    Blood Type: B Positive  Antibody Screen: Negative  RPR: Negative               11.1   6.42  )-----------( 158      ( 09-28 @ 03:57 )             33.6       MEDICATIONS  (STANDING):  acetaminophen     Tablet .. 975 milliGRAM(s) Oral <User Schedule>  diphtheria/tetanus/pertussis (acellular) Vaccine (Adacel) 0.5 milliLiter(s) IntraMuscular once  ibuprofen  Tablet. 600 milliGRAM(s) Oral every 6 hours  oxytocin Infusion 167 milliUNIT(s)/Min (167 mL/Hr) IV Continuous <Continuous>  prenatal multivitamin 1 Tablet(s) Oral daily  sodium chloride 0.9% lock flush 3 milliLiter(s) IV Push every 8 hours    MEDICATIONS  (PRN):  benzocaine 20%/menthol 0.5% Spray 1 Spray(s) Topical every 6 hours PRN for Perineal discomfort  dibucaine 1% Ointment 1 Application(s) Topical every 6 hours PRN Perineal discomfort  diphenhydrAMINE 25 milliGRAM(s) Oral every 6 hours PRN Pruritus  hydrocortisone 1% Cream 1 Application(s) Topical every 6 hours PRN Moderate Pain (4-6)  lanolin Ointment 1 Application(s) Topical every 6 hours PRN nipple soreness  magnesium hydroxide Suspension 30 milliLiter(s) Oral two times a day PRN Constipation  oxyCODONE    IR 5 milliGRAM(s) Oral every 3 hours PRN Moderate to Severe Pain (4-10)  oxyCODONE    IR 5 milliGRAM(s) Oral once PRN Moderate to Severe Pain (4-10)  pramoxine 1%/zinc 5% Cream 1 Application(s) Topical every 4 hours PRN Moderate Pain (4-6)  simethicone 80 milliGRAM(s) Chew every 4 hours PRN Gas  witch hazel Pads 1 Application(s) Topical every 4 hours PRN Perineal discomfort

## 2024-09-30 NOTE — DISCHARGE NOTE OB - NS MD DC FALL RISK RISK
For information on Fall & Injury Prevention, visit: [email or web address] OR  [email or web address] OR  https://www.cdc.gov/steadi/patient.html

## 2024-09-30 NOTE — DISCHARGE NOTE OB - MEDICATION SUMMARY - MEDICATIONS TO TAKE
I will START or STAY ON the medications listed below when I get home from the hospital:    ibuprofen 600 mg oral tablet  -- 1 tab(s) by mouth every 6 hours  -- Indication: For pain    acetaminophen 325 mg oral tablet  -- 3 tab(s) by mouth every 6 hours  -- Indication: For pain    benzocaine 20% topical spray  -- 1 Apply on skin to affected area every 6 hours As needed for Perineal discomfort  -- Indication: For pain    dibucaine 1% topical ointment  -- 1 Apply on skin to affected area every 6 hours As needed Perineal discomfort  -- Indication: For pain    pramoxine 1% topical cream  -- 1 Apply on skin to affected area every 4 hours As needed Moderate Pain (4-6)  -- Indication: For pain    hydrocortisone 1% topical cream  -- 1 Apply on skin to affected area every 6 hours As needed Moderate Pain (4-6)  -- Indication: For pain    lanolin topical ointment  -- 1 Apply on skin to affected area every 6 hours As needed nipple soreness  -- Indication: For breastfeeding    witch hazel 50% topical pad  -- 1 Apply on skin to affected area every 4 hours As needed Perineal discomfort  -- Indication: For hemorrhoids    Prenatal Multivitamins with Folic Acid 1 mg oral tablet  -- 1 tab(s) by mouth once a day  -- Indication: For breastfeeding, postpartum

## 2024-09-30 NOTE — DISCHARGE NOTE OB - HOSPITAL COURSE
37 y/o  at 40wks EGA admitted for elective induction of labor. S/P  of a living infant. Postpartum course was unremarkable. PAtinet stable and discharged home

## 2024-10-01 ENCOUNTER — APPOINTMENT (OUTPATIENT)
Dept: OBGYN | Facility: CLINIC | Age: 39
End: 2024-10-01

## 2024-10-01 ENCOUNTER — APPOINTMENT (OUTPATIENT)
Dept: ANTEPARTUM | Facility: CLINIC | Age: 39
End: 2024-10-01

## 2024-11-12 ENCOUNTER — APPOINTMENT (OUTPATIENT)
Dept: OBGYN | Facility: CLINIC | Age: 39
End: 2024-11-12
Payer: COMMERCIAL

## 2024-11-12 VITALS
WEIGHT: 129 LBS | HEIGHT: 65 IN | SYSTOLIC BLOOD PRESSURE: 114 MMHG | BODY MASS INDEX: 21.49 KG/M2 | DIASTOLIC BLOOD PRESSURE: 70 MMHG

## 2024-11-12 PROCEDURE — 0503F POSTPARTUM CARE VISIT: CPT
